# Patient Record
Sex: MALE | Race: WHITE | Employment: FULL TIME | URBAN - METROPOLITAN AREA
[De-identification: names, ages, dates, MRNs, and addresses within clinical notes are randomized per-mention and may not be internally consistent; named-entity substitution may affect disease eponyms.]

---

## 2023-10-10 ENCOUNTER — TELEPHONE (OUTPATIENT)
Dept: NEUROSURGERY | Facility: CLINIC | Age: 23
End: 2023-10-10
Payer: COMMERCIAL

## 2023-10-10 NOTE — TELEPHONE ENCOUNTER
Contacted patient regarding new patient appointment that was scheduled inappropriately. Patient does not have a referral order in system. Informed patient that he would need to see his primary care physician for a referral and he would need to receive imaging( CT or MRI). Informed patient that appointment will be canceled until he receive a referral. Patient verbalized understanding.